# Patient Record
Sex: MALE | Race: WHITE
[De-identification: names, ages, dates, MRNs, and addresses within clinical notes are randomized per-mention and may not be internally consistent; named-entity substitution may affect disease eponyms.]

---

## 2019-01-14 ENCOUNTER — HOSPITAL ENCOUNTER (INPATIENT)
Dept: HOSPITAL 74 - JSAMEDAYSX | Age: 24
LOS: 4 days | Discharge: HOME | DRG: 304 | End: 2019-01-18
Attending: SPECIALIST | Admitting: ORTHOPAEDIC SURGERY
Payer: COMMERCIAL

## 2019-01-14 VITALS — BODY MASS INDEX: 19.8 KG/M2

## 2019-01-14 DIAGNOSIS — R00.0: ICD-10-CM

## 2019-01-14 DIAGNOSIS — M54.17: ICD-10-CM

## 2019-01-14 DIAGNOSIS — Z98.890: ICD-10-CM

## 2019-01-14 DIAGNOSIS — Z87.891: ICD-10-CM

## 2019-01-14 DIAGNOSIS — M48.07: ICD-10-CM

## 2019-01-14 DIAGNOSIS — R31.0: ICD-10-CM

## 2019-01-14 DIAGNOSIS — M48.062: Primary | ICD-10-CM

## 2019-01-14 DIAGNOSIS — M51.37: ICD-10-CM
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
## 2019-01-14 DIAGNOSIS — T83.83XA: ICD-10-CM

## 2019-01-14 DIAGNOSIS — M53.2X6: ICD-10-CM

## 2019-01-14 PROCEDURE — B01BZZZ FLUOROSCOPY OF SPINAL CORD: ICD-10-PCS | Performed by: ORTHOPAEDIC SURGERY

## 2019-01-14 PROCEDURE — 0ST40ZZ RESECTION OF LUMBOSACRAL DISC, OPEN APPROACH: ICD-10-PCS | Performed by: ORTHOPAEDIC SURGERY

## 2019-01-14 PROCEDURE — 07DR0ZZ EXTRACTION OF ILIAC BONE MARROW, OPEN APPROACH: ICD-10-PCS | Performed by: ORTHOPAEDIC SURGERY

## 2019-01-14 PROCEDURE — 4A1004G MONITORING OF CENTRAL NERVOUS ELECTRICAL ACTIVITY, INTRAOPERATIVE, OPEN APPROACH: ICD-10-PCS | Performed by: ORTHOPAEDIC SURGERY

## 2019-01-14 PROCEDURE — 0SG30AJ FUSION OF LUMBOSACRAL JOINT WITH INTERBODY FUSION DEVICE, POSTERIOR APPROACH, ANTERIOR COLUMN, OPEN APPROACH: ICD-10-PCS | Performed by: ORTHOPAEDIC SURGERY

## 2019-01-14 NOTE — OP
Operative Note





- Note:


Operative Date: 01/14/19


Pre-Operative Diagnosis: 1. L5-S1 intervertebral disc disorder with lower 

extremity radiculopathy.  2. L5-S1 spinal stenosis with neurogenic 

claudication.  3. L5-S1 segmental instability


Operation: 1. L5 laminectomy.  2. L3 & S1 laminotomies.  3. L5-S1 PLIF.  4. L5 

facetectomy/osteotomy.  5. L5-S1 posterior instrumentation.  6. L5-S1 

posterolateral arthrodesis.  7. Bone autograft.  8. Bone allograft.  9. Bone 

marrow aspiration.  10. Complex wound closure (10cm).  11. Biplanar 

fluoroscopy.  12. Intra-operative neural monitoring.  13. Bilateral L4, L5, S1 

dorsal ramus nerve blocks.


Post-Operative Diagnosis: Same as Pre-op


Surgeon: Carlitos Toribio


Assistant: Ismael Toribio


Anesthesiologist/CRNA: Radha Collins


Anesthesia: General, Local


Specimens Removed: L5-S1 disc


Estimated Blood Loss (mls): 600


Drains & Tubes with Location: 1 x deep HemoVac


Blood Volume Replaced (mls): 140 (Cell Saver)


Fluid Volume Replaced (mls): 1,400 (Crystalloid)


Operative Report Dictated: Yes

## 2019-01-14 NOTE — PN
Progress Note (short form)





- Note


Progress Note: 





23M s/p L5 laminectomy; L4 & S1 laminotomies; L5-S1 PLIF; L5-S1 posterior 

instrumentation; L5-S1 posterolateral arthrodesis; bone graft; bone marrow 

aspiration POD #0.





-Admit to ICU post-op.





-Pain control: per anaesthesia team; recommend PCA; no NSAID's.


-DVT PPx:


   - Mechanical only: SHERI's, SCD's.


-Incentive spirometry q15min.


-PT/OT/Rehab, OOB.


-WBAT B/L LE.


-q4h B/L LE NV checks.


-Post-op antibiotics x 2 doses.


-NPO until flatus.


-f/u AM labs.


-f/u drain output.


-d/c Cassidy catheter when patient ambulating comfortably.


-Care per ICU & medical hospitalist team.


-Discharge planning: f/u 1/25/2019 at [facility] [facility]; call for 

appointment; [phone].


-Will follow.





Carlitos Toribio MD (Orthopaedic Surgery).

## 2019-01-15 LAB
ANION GAP SERPL CALC-SCNC: 7 MMOL/L (ref 8–16)
BUN SERPL-MCNC: 14 MG/DL (ref 7–18)
CALCIUM SERPL-MCNC: 9 MG/DL (ref 8.5–10.1)
CHLORIDE SERPL-SCNC: 104 MMOL/L (ref 98–107)
CO2 SERPL-SCNC: 27 MMOL/L (ref 21–32)
CREAT SERPL-MCNC: 0.9 MG/DL (ref 0.55–1.3)
DEPRECATED RDW RBC AUTO: 13.3 % (ref 11.9–15.9)
GLUCOSE SERPL-MCNC: 113 MG/DL (ref 74–106)
HCT VFR BLD CALC: 38.2 % (ref 35.4–49)
HGB BLD-MCNC: 12.6 GM/DL (ref 11.7–16.9)
MCH RBC QN AUTO: 30.9 PG (ref 25.7–33.7)
MCHC RBC AUTO-ENTMCNC: 32.9 G/DL (ref 32–35.9)
MCV RBC: 93.9 FL (ref 80–96)
PLATELET # BLD AUTO: 185 K/MM3 (ref 134–434)
PMV BLD: 8.2 FL (ref 7.5–11.1)
POTASSIUM SERPLBLD-SCNC: 4.6 MMOL/L (ref 3.5–5.1)
RBC # BLD AUTO: 4.07 M/MM3 (ref 4–5.6)
SODIUM SERPL-SCNC: 137 MMOL/L (ref 136–145)
WBC # BLD AUTO: 7.4 K/MM3 (ref 4–10)

## 2019-01-15 RX ADMIN — ACETAMINOPHEN SCH MG: 10 INJECTION, SOLUTION INTRAVENOUS at 09:43

## 2019-01-15 RX ADMIN — DOCUSATE SODIUM,SENNOSIDES SCH TABLET: 50; 8.6 TABLET, FILM COATED ORAL at 22:38

## 2019-01-15 RX ADMIN — HYDROMORPHONE HYDROCHLORIDE SCH MG: 10 INJECTION, SOLUTION INTRAMUSCULAR; INTRAVENOUS; SUBCUTANEOUS at 00:20

## 2019-01-15 RX ADMIN — CEFAZOLIN SODIUM SCH MLS/HR: 1 INJECTION, SOLUTION INTRAVENOUS at 12:50

## 2019-01-15 RX ADMIN — ACETAMINOPHEN SCH MG: 10 INJECTION, SOLUTION INTRAVENOUS at 00:10

## 2019-01-15 RX ADMIN — SODIUM CHLORIDE, POTASSIUM CHLORIDE, SODIUM LACTATE AND CALCIUM CHLORIDE SCH: 600; 310; 30; 20 INJECTION, SOLUTION INTRAVENOUS at 03:03

## 2019-01-15 RX ADMIN — MORPHINE SULFATE PRN MG: 2 INJECTION, SOLUTION INTRAMUSCULAR; INTRAVENOUS at 20:06

## 2019-01-15 RX ADMIN — ACETAMINOPHEN SCH MG: 10 INJECTION, SOLUTION INTRAVENOUS at 15:55

## 2019-01-15 RX ADMIN — SODIUM CHLORIDE, POTASSIUM CHLORIDE, SODIUM LACTATE AND CALCIUM CHLORIDE SCH MLS/HR: 600; 310; 30; 20 INJECTION, SOLUTION INTRAVENOUS at 08:33

## 2019-01-15 RX ADMIN — Medication SCH APPLIC: at 09:56

## 2019-01-15 RX ADMIN — FAMOTIDINE SCH MLS/HR: 20 INJECTION, SOLUTION INTRAVENOUS at 10:01

## 2019-01-15 RX ADMIN — FAMOTIDINE SCH MLS/HR: 20 INJECTION, SOLUTION INTRAVENOUS at 22:38

## 2019-01-15 RX ADMIN — CEFAZOLIN SODIUM SCH MLS/HR: 1 INJECTION, SOLUTION INTRAVENOUS at 05:05

## 2019-01-15 RX ADMIN — KETOROLAC TROMETHAMINE SCH MG: 30 INJECTION INTRAMUSCULAR; INTRAVENOUS at 17:41

## 2019-01-15 RX ADMIN — KETOROLAC TROMETHAMINE SCH MG: 30 INJECTION INTRAMUSCULAR; INTRAVENOUS at 11:52

## 2019-01-15 RX ADMIN — HYDROMORPHONE HYDROCHLORIDE SCH: 10 INJECTION, SOLUTION INTRAMUSCULAR; INTRAVENOUS; SUBCUTANEOUS at 19:40

## 2019-01-15 RX ADMIN — MORPHINE SULFATE PRN MG: 2 INJECTION, SOLUTION INTRAMUSCULAR; INTRAVENOUS at 15:54

## 2019-01-15 NOTE — PN
Progress Note, Physician


History of Present Illness: 





24 HR EVENTS:





s/p L5, L3, S1 laminectomy, L5 facetectomy/osteotomy





pt pulled out hernandez in PACU, no urine output overnight. At the time of exam, pt 

with severe pelvic and lower back pain resulting in tachycardia. Urology called 

for hernandez placement. 


pt now tolerating orals





- Current Medication List


Current Medications: 


Active Medications





Famotidine/Sodium Chloride (Pepcid 20 Mg Premixed Ivpb -)  20 mg in 50 mls @ 

100 mls/hr IVPB BID [facility]


   Last Admin: 01/15/19 10:01 Dose:  100 mls/hr


Morphine Sulfate (Morphine Sulfate)  2 mg IVPUSH Q3H PRN


   PRN Reason: PAIN LEVEL 6-10


   Last Admin: 01/15/19 20:06 Dose:  2 mg


Ondansetron HCl (Zofran Injection)  4 mg IVPUSH Q6H PRN


   PRN Reason: NAUSEA AND/OR VOMITING


Ondansetron HCl (Zofran Injection)  4 mg IVPUSH Q6H PRN


   PRN Reason: NAUSEA AND/OR VOMITING


Oxycodone HCl (Roxicodone -)  10 mg PO Q6H PRN


   PRN Reason: PAIN LEVEL 1-5


Saliva Substitute (Mouthkote Solution -)  1 applic MM DAILY [facility]


   Last Admin: 01/15/19 09:56 Dose:  1 applic











- Objective


Vital Signs: 


 Vital Signs











Temperature  99.1 F   01/15/19 18:00


 


Pulse Rate  97 H  01/15/19 20:00


 


Respiratory Rate  15   01/15/19 20:00


 


Blood Pressure  132/74   01/15/19 20:00


 


O2 Sat by Pulse Oximetry (%)  100   01/15/19 00:45











Labs: 


 CBC, BMP





 01/15/19 05:30 





 01/15/19 05:30 











Problem List





- Problems


(1) S/P laminectomy


Assessment/Plan: 


progress diet as tolerated


IC


ambulate with PT


pain control with oxycodone and morphine








Code(s): Z98.890 - OTHER SPECIFIED POSTPROCEDURAL STATES   





Impression/Plan


Impression/Plan: 





DISPO: transfer to step floor





DVT PPX: venodynes





GI: pepcid, Zofran PRN nausea





d/c hernandez in the morning. 








Visit type





- Emergency Visit


Emergency Visit: No





- New Patient


This patient is new to me today: Yes


Date on this admission: 01/14/19





- Critical Care


Critical Care patient: Yes


Total Critical Care Time (in minutes): 30


Critical Care Statement: The care of this patient involved high complexity 

decision making to prevent further life threatening deterioration of the patient

's condition and/or to evaluate & treat vital organ system(s) failure or risk 

of failure.





- Discharge Referral


Referred to [facility] No
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
## 2019-01-15 NOTE — CONSULT
Consult - text type





- Consultation


Consultation Note: 





called to see 22 yo pt s/p laminectomy L5-S1 who self d/c ed hernandez w resulting 

gross hematuria


staff unable to pass catheter


Pt now c/o distention and pain unable to void





18 fr hernandez placed to SD approx 1200 cc drained without difficulty


urine clear 


Consider voiding trial in am or when more mobile

## 2019-01-15 NOTE — PN
Teaching Attending Note


Name of Resident: Viktoria Singletary





ATTENDING PHYSICIAN STATEMENT





I saw and evaluated the patient.


I reviewed the resident's note and discussed the case with the resident.


I agree with the resident's findings and plan as documented.








SUBJECTIVE:


Patient seen and examined in the ICU.  Awake and alert.  Unable to pass urine 

has he traumatically pulled his hernandez out in the PACU.  Urology has been 

called. 


No CP or SOB.


Post op pain is 8/10.  Noted PCA was discontinued by anesthesia. 





 Intake & Output











 01/12/19 01/13/19 01/14/19 01/15/19





 23:59 23:59 23:59 23:59


 


Intake Total   1840 50


 


Output Total   1030 50


 


Balance   810 0








 Last Vital Signs











Temp Pulse Resp BP Pulse Ox


 


 98.7 F   97 H  8 L  131/83   100 


 


 01/15/19 10:00  01/15/19 12:00  01/15/19 12:00  01/15/19 12:00  01/15/19 00:45








Active Medications





Acetaminophen (Ofirmev Injection -)  1,000 mg IVPB Q8H [facility]


   Stop: 01/15/19 15:46


   Last Admin: 01/15/19 09:43 Dose:  1,000 mg


Cefazolin Sodium (Ancef 1 Gm Premixed Ivpb -)  1 gm in 50 mls @ 100 mls/hr IVPB 

Q8H [facility]


   Stop: 01/15/19 13:29


   Last Admin: 01/15/19 05:05 Dose:  100 mls/hr


Lactated Ringer's (Lactated Ringers Solution)  1,000 mls @ 125 mls/hr IV ASDIR 

[facility]


   Last Admin: 01/15/19 00:20 Dose:  50 mls


Lactated Ringer's (Lactated Ringers Solution)  1,000 mls @ 125 mls/hr IV ASDIR 

[facility]


   Last Admin: 01/15/19 08:33 Dose:  125 mls/hr


Famotidine/Sodium Chloride (Pepcid 20 Mg Premixed Ivpb -)  20 mg in 50 mls @ 

100 mls/hr IVPB BID [facility]


   Last Admin: 01/15/19 10:01 Dose:  100 mls/hr


Ketorolac Tromethamine (Toradol Injection -)  30 mg IM Q8H-IV [facility]


   Stop: 01/15/19 18:01


   Last Admin: 01/15/19 11:52 Dose:  30 mg


Ondansetron HCl (Zofran Injection)  4 mg IVPUSH Q6H PRN


   PRN Reason: NAUSEA AND/OR VOMITING


Ondansetron HCl (Zofran Injection)  4 mg IVPUSH Q6H PRN


   PRN Reason: NAUSEA AND/OR VOMITING


Oxycodone HCl (Roxicodone -)  10 mg PO Q6H PRN


   PRN Reason: PAIN LEVEL 6-10


   Last Admin: 01/15/19 09:03 Dose:  10 mg


Oxycodone HCl (Roxicodone -)  5 mg PO Q4H PRN


   PRN Reason: PAIN LEVEL 1-5


   Last Admin: 01/15/19 10:08 Dose:  5 mg


Saliva Substitute (Mouthkote Solution -)  1 applic MM DAILY [facility]


   Last Admin: 01/15/19 09:56 Dose:  1 applic








Constitutional: Yes: Agitated, NAD 


Eyes: Yes: Conjunctiva Clear, EOM Intact, PERRL


HENT: Yes: Atraumatic, Normocephalic, Other (very dry mucous membranes).  No: 

Thrush


Neck: Yes: Supple, Trachea Midline.  No: Lymphadenopathy, Thyromegaly


Cardiovascular: Yes: Regular Rate and Rhythm.  No: Murmur


Respiratory: Yes: Regular, CTA Bilaterally


...Inspection: Yes: Trachea Midline


Gastrointestinal: Yes: (+) supra-pubic distention 


Musculoskeletal: Yes: Back Pain 


Extremities: Yes: WNL


Edema: No


Peripheral Pulses WNL: Yes


Neurological: Yes: Alert, Oriented


 Laboratory Results - last 24 hr











  01/14/19 01/14/19 01/15/19





  12:34 13:40 05:30


 


WBC    7.4


 


RBC    4.07


 


Hgb    12.6


 


Hct    38.2


 


MCV    93.9


 


MCH    30.9


 


MCHC    32.9


 


RDW    13.3


 


Plt Count    185


 


MPV    8.2


 


Sodium   


 


Potassium   


 


Chloride   


 


Carbon Dioxide   


 


Anion Gap   


 


BUN   


 


Creatinine   


 


Creat Clearance w eGFR   


 


Random Glucose   


 


Calcium   


 


Blood Type  A POSITIVE  A POSITIVE 


 


Antibody Screen  Negative  














  01/15/19





  05:30


 


WBC 


 


RBC 


 


Hgb 


 


Hct 


 


MCV 


 


MCH 


 


MCHC 


 


RDW 


 


Plt Count 


 


MPV 


 


Sodium  137


 


Potassium  4.6


 


Chloride  104


 


Carbon Dioxide  27


 


Anion Gap  7 L


 


BUN  14


 


Creatinine  0.9


 


Creat Clearance w eGFR  > 60


 


Random Glucose  113 H


 


Calcium  9.0


 


Blood Type 


 


Antibody Screen 











Assessment/Plan


POD #1: 


1. L5 laminectomy


2. L3 & S1 laminotomies


3. L5-S1 PLIF


4. L5 facetectomy/osteotomy


5. L5-S1 posterior instrumentation


6. L5-S1 posterolateral arthrodesis


7. Bone autograft


8. Bone allograft


9. Bone marrow aspiration


10. Complex wound closure (10cm)


11. Biplanar fluoroscopy


12. Intra-operative neural monitoring


13. Bilateral L4, L5, S1 dorsal ramus nerve blocks





Urinary retention due to traumatic hernandez removal by patient 





Urology evaluation 


Pain control 


Incentive Spirometry 


VTE prophylaxis 


PO as tolerated 


PT 


Neuro checks 


Floor when OK with surgical attending 





Dr Lyle

## 2019-01-15 NOTE — OP
DATE OF OPERATION:  

 

DATE OF DICTATION:  01/14/2019

 

SURGEON:  Carlitos Toribio MD

 

ASSISTANT:  Ismael Toribio MD



PRE-OP DIAGNOSIS:

   1. L5-S1 intervertebral disc disorder with lower extremity radiculopathy.  

   2. L5-S1 spinal stenosis with neurogenic claudication.  

   3. L5-S1 segmental instability



POST-OP DIAGNOSIS: 

   1. L5-S1 intervertebral disc disorder with lower extremity radiculopathy.  

   2. L5-S1 spinal stenosis with neurogenic claudication.  

   3. L5-S1 segmental instability



OPERATION: 

   1. L5 laminectomy.  

   2. L3 & S1 laminotomies.  

   3. L5-S1 PLIF.  

   4. L5 facetectomy/osteotomy.  

   5. L5-S1 posterior instrumentation.  

   6. L5-S1 posterolateral arthrodesis.  

   7. Bone autograft.  

   8. Bone allograft.  

   9. Bone marrow aspiration.  

   10. Complex wound closure (15cm).  

   11. Biplanar fluoroscopy.  

   12. Intra-operative neural monitoring.  

   13. Bilateral L4, L5, S1 dorsal ramus nerve blocks (Marcaine & Exparel).

 

ANESTHESIA:  General.

 

ANTIBIOTICS GIVEN:  Kefzol 2 g, 1 g of vancomycin preoperatively.

 

OPERATION IN DETAIL:  The patient correctly identified, brought to the operating

room.  Imaging was available for intraoperative evaluation.   Timeout was 
called. 

The patient placed prone on a Tigre table.  All bony prominences padded

appropriately.  Eyes were padded for pressure and the table was set at 10 
degrees in

deep Trendelenburg for optic vein venous flow.

 

A midline incision was utilized from the tip of the spinous process of L4 to 
the tip

of the spinous process of S1.  Subperiosteal dissection was performed.  
Anatomical

guidelines revealed the correct levels of the last mobile segment of the spine.
  A

lateral fluoroscopic x-ray confirmed the correct disk for resection which was 
the

L5-S1 disk depending on the nomenclature of the spine.  Using Leksell rongeurs 
and

Mercedes bone cutters the lamina of L5 was resected in order to resect 
laterally.  The

pars interarticularis as well as the inferior facet was osteotomized imploding 
the

bone inwards.  The disk from the right-hand side was approached with a nerve 
root

retractor being placed under the theca distracting it from right to left.  The 
large

epidural veins were dealt with with bipolar Bovie.  Large bulging disk was

encountered, but most applicable to this case was the malalignment of facet 
joints

readily seen that were subluxed completely on each other  at L5-S1.

 

The disk of L5-S1 was dealt with as follows:  A transverse annulotomy was 
performed. 

Kat right up to size 12 were inserted.  All disk material was removed using

pituitary rongeurs.  Serrated curets helped ensure that the endplates were free 
of

any soft tissue.  Once this had been performed the interbody space was packed 
with

the bone that was harvested previously and packed into the interspace at

L5-S1.  A size 13, 22 x 13-mm cage inserted.  The cage itself was packed with 
bone

graft.  Solid fixation, excellent position seen on lateral fluoroscopic x-ray 
once

inserted.  The pedicles of L5-S1 were identified using anatomical guidelines. 

Lateral fluoroscopic x-ray enabled the 4.5 drill was used to drill through the

pedicle into the vertebral body.  The 45 x 6.5 screws at L5 and 6.5 x 40-mm 
screws at

L4 inserted.  Screws were tested with neural monitoring and found to be 
completely

safe in the appropriate parameters of safety, that is well above 20 mA for each 
screw

except in the right S1 screw which measured 12 mA.  The rods were measured.  
These

were 40-mm rods fixed to the capture and the caps tightened appropriately with 
the

appropriate torque device from the precision instrumentation system.

 

The wounds were thoroughly lavaged.  Bone grafting which was a combination of

autologous bone, allograft bone and this mixed with stem cells harvested from 
the

left posterior ilium.  A Jamshidi needle was placed into the posterior ilium

aspirating 60 mL of marrow.  This enabled a rich biological bone grafting to the

transverse process/alar interface appropriately.  Wounds were thoroughly 
lavaged. 

Closure:  Muscle 1 Vicryl, fascia 1 Vicryl, subcu 1 and 2-0 Vicryl, skin 3-0 
Monocryl

with Steri-Strips.  Drainage:  A 1/8-inch Hemovac x1 deep.  No complications. 

Operation went extremely well.  It was reported as the intraoperative neural

monitoring revealed once the decompression and diskectomy were performed that 
the

actual neural monitoring of the quadriceps as well as sciatic nerves improved

dramatically.  Patient was transferred out of the operating room and will be 
nursed

in the ICU postoperatively.

 

 

MD KATY Tolliver/[id number]

DD: 01/14/2019 23:23

DT: 01/15/2019 09:50

Job #:  [id number]

[email or web address]

## 2019-01-15 NOTE — PN
Progress Note (short form)





- Note


Progress Note: 





Anesthesiology Post-op/Pain Service


23 y.o. man POD#1 s/p lumbar PLIF under GA with post-op PCA.


Pt. is sitting -up, taking PO without difficulty in NAD. He does c/o pain and 

had been using PCA. VSS. No other complaints.


23 y.o. man with stable post-operative course.


Will d/c PCA and start PO pain meds. d/w RN and pt.

## 2019-01-16 LAB
ALBUMIN SERPL-MCNC: 3.2 G/DL (ref 3.4–5)
ALP SERPL-CCNC: 40 U/L (ref 45–117)
ALT SERPL-CCNC: 23 U/L (ref 13–61)
ANION GAP SERPL CALC-SCNC: 5 MMOL/L (ref 8–16)
AST SERPL-CCNC: 21 U/L (ref 15–37)
BASOPHILS # BLD: 0.3 % (ref 0–2)
BILIRUB SERPL-MCNC: 0.3 MG/DL (ref 0.2–1)
BUN SERPL-MCNC: 13 MG/DL (ref 7–18)
CALCIUM SERPL-MCNC: 8.1 MG/DL (ref 8.5–10.1)
CHLORIDE SERPL-SCNC: 107 MMOL/L (ref 98–107)
CO2 SERPL-SCNC: 30 MMOL/L (ref 21–32)
CREAT SERPL-MCNC: 0.9 MG/DL (ref 0.55–1.3)
DEPRECATED RDW RBC AUTO: 13.6 % (ref 11.9–15.9)
EOSINOPHIL # BLD: 1.5 % (ref 0–4.5)
GLUCOSE SERPL-MCNC: 119 MG/DL (ref 74–106)
HCT VFR BLD CALC: 31.7 % (ref 35.4–49)
HGB BLD-MCNC: 11.1 GM/DL (ref 11.7–16.9)
LYMPHOCYTES # BLD: 26.2 % (ref 8–40)
MAGNESIUM SERPL-MCNC: 2.5 MG/DL (ref 1.8–2.4)
MCH RBC QN AUTO: 32.9 PG (ref 25.7–33.7)
MCHC RBC AUTO-ENTMCNC: 35.1 G/DL (ref 32–35.9)
MCV RBC: 93.6 FL (ref 80–96)
MONOCYTES # BLD AUTO: 9.2 % (ref 3.8–10.2)
NEUTROPHILS # BLD: 62.8 % (ref 42.8–82.8)
PHOSPHATE SERPL-MCNC: 5.1 MG/DL (ref 2.5–4.9)
PLATELET # BLD AUTO: 164 K/MM3 (ref 134–434)
PMV BLD: 8.3 FL (ref 7.5–11.1)
POTASSIUM SERPLBLD-SCNC: 3.5 MMOL/L (ref 3.5–5.1)
PROT SERPL-MCNC: 5.6 G/DL (ref 6.4–8.2)
RBC # BLD AUTO: 3.39 M/MM3 (ref 4–5.6)
SODIUM SERPL-SCNC: 142 MMOL/L (ref 136–145)
WBC # BLD AUTO: 7.4 K/MM3 (ref 4–10)

## 2019-01-16 RX ADMIN — POLYETHYLENE GLYCOL 3350 SCH GM: 17 POWDER, FOR SOLUTION ORAL at 13:24

## 2019-01-16 RX ADMIN — MORPHINE SULFATE PRN MG: 2 INJECTION, SOLUTION INTRAMUSCULAR; INTRAVENOUS at 07:17

## 2019-01-16 RX ADMIN — Medication SCH: at 09:39

## 2019-01-16 RX ADMIN — FAMOTIDINE SCH MLS/HR: 20 INJECTION, SOLUTION INTRAVENOUS at 10:02

## 2019-01-16 RX ADMIN — DOCUSATE SODIUM,SENNOSIDES SCH TABLET: 50; 8.6 TABLET, FILM COATED ORAL at 21:22

## 2019-01-16 RX ADMIN — DOCUSATE SODIUM SCH MG: 100 CAPSULE, LIQUID FILLED ORAL at 11:39

## 2019-01-16 RX ADMIN — MORPHINE SULFATE PRN MG: 2 INJECTION, SOLUTION INTRAMUSCULAR; INTRAVENOUS at 18:15

## 2019-01-16 RX ADMIN — MORPHINE SULFATE PRN MG: 2 INJECTION, SOLUTION INTRAMUSCULAR; INTRAVENOUS at 10:17

## 2019-01-16 NOTE — PATH
Surgical Pathology Report



Patient Name:  KIRSTEN BRUNO

Accession #:  

Med. Rec. #:  [id number]                                                        

   /Age/Gender:  1995 (Age: 23) / M

Account:  [id number]                                                          

             Location: [facility] 

Taken:  2019

Received:  1/15/2019

Reported:  2019

Physicians:  Carlitos Toribio M.D.

  



Specimen(s) Received

 L5-S1 DISC 





Clinical History

Spinal stenosis lumbar region







Final Diagnosis

DISC, L5-S1, POSTERIOR LUMBAR INTERBODY FUSION:

BENIGN INTERVERTEBRAL DISC TISSUE, BONE, AND BONE MARROW WITH TRILINEAGE

HEMATOPOIESIS.





***Electronically Signed***

Mary Lozano M.D.





Gross Description

Received in formalin labeled "disc L5-S1," is a 4.5 x 3.8 x 0.4 cm aggregate of

tan fragments of fibrocartilaginous tissue. A representative portion is

submitted in one cassette.

/1/15/2019



saudi/1/15/2019
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
## 2019-01-16 NOTE — PN
Teaching Attending Note


Name of Resident: Kenneth Rodriguez





ATTENDING PHYSICIAN STATEMENT





I saw and evaluated the patient.


I reviewed the resident's note and discussed the case with the resident.


I agree with the resident's findings and plan as documented.








SUBJECTIVE:





Pt seen and examined in the ICU. Still with pain. No nausea or vomiting. 

Tolerating diet. No fevers or chills. No shortness of breath or chest pain.





OBJECTIVE:





 Vital Signs











 Period  Temp  Pulse  Resp  BP Sys/Vogt  Pulse Ox


 


 Last 24 Hr  98.3 F-99.1 F    12-17  /49-78  97-97








 Intake & Output











 01/13/19 01/14/19 01/15/19 01/16/19





 23:59 23:59 23:59 23:59


 


Intake Total  1840 1610 


 


Output Total  1030 2850 


 


Balance  810 -1240 








Gen:  NAD at rest


Heart: RRR


Lung: decreased breath sounds at the bases


Abd: soft, nontender


Ext: no edema


Drain with serosanguinous fluid





 CBC, BMP





 01/16/19 05:30 





 01/16/19 05:30 





Active Medications





Docusate Sodium (Colace -)  100 mg PO DAILY [facility]


   Last Admin: 01/16/19 11:39 Dose:  100 mg


Famotidine/Sodium Chloride (Pepcid 20 Mg Premixed Ivpb -)  20 mg in 50 mls @ 

100 mls/hr IVPB BID RILEY


   Last Admin: 01/16/19 10:02 Dose:  100 mls/hr


Morphine Sulfate (Morphine Sulfate)  2 mg IVPUSH Q3H PRN


   PRN Reason: PAIN LEVEL 6-10


   Last Admin: 01/16/19 10:17 Dose:  2 mg


Ondansetron HCl (Zofran Injection)  4 mg IVPUSH Q6H PRN


   PRN Reason: NAUSEA AND/OR VOMITING


Ondansetron HCl (Zofran Injection)  4 mg IVPUSH Q6H PRN


   PRN Reason: NAUSEA AND/OR VOMITING


Oxycodone HCl (Roxicodone -)  10 mg PO Q6H PRN


   PRN Reason: PAIN LEVEL 1-5


   Last Admin: 01/16/19 06:17 Dose:  10 mg


Polyethylene Glycol (Miralax (For Daily Use) -)  17 gm PO DAILY [facility]


Saliva Substitute (Mouthkote Solution -)  1 applic MM DAILY RILEY


   Last Admin: 01/16/19 09:39 Dose:  Not Given


Senna/Docusate Sodium (Pericolace -)  1 tablet PO HS RILEY


   Last Admin: 01/15/19 22:38 Dose:  1 tablet








ASSESSMENT AND PLAN:


L5-S1 Spinal Stenosis with Neurogenic Claudication/Radiculopathy


s/p L5, L3, S1 Laminectomies/L5-S1 PLIF/instrumentation





-  pain control


-  incentive spirometry


-  monitor drain output


-  bowel regimen


-  d/c hernandez


-  OOB 


-  rehab/PT


-  DVT prophylaxis


-  can monitor on floor

## 2019-01-17 LAB
ALBUMIN SERPL-MCNC: 3.4 G/DL (ref 3.4–5)
ALP SERPL-CCNC: 48 U/L (ref 45–117)
ALT SERPL-CCNC: 79 U/L (ref 13–61)
ANION GAP SERPL CALC-SCNC: 8 MMOL/L (ref 8–16)
AST SERPL-CCNC: 77 U/L (ref 15–37)
BASOPHILS # BLD: 0.3 % (ref 0–2)
BILIRUB SERPL-MCNC: 0.5 MG/DL (ref 0.2–1)
BUN SERPL-MCNC: 7 MG/DL (ref 7–18)
CALCIUM SERPL-MCNC: 8.2 MG/DL (ref 8.5–10.1)
CHLORIDE SERPL-SCNC: 105 MMOL/L (ref 98–107)
CO2 SERPL-SCNC: 28 MMOL/L (ref 21–32)
CREAT SERPL-MCNC: 0.9 MG/DL (ref 0.55–1.3)
DEPRECATED RDW RBC AUTO: 13.5 % (ref 11.9–15.9)
EOSINOPHIL # BLD: 2.5 % (ref 0–4.5)
GLUCOSE SERPL-MCNC: 97 MG/DL (ref 74–106)
HCT VFR BLD CALC: 32.5 % (ref 35.4–49)
HGB BLD-MCNC: 11.4 GM/DL (ref 11.7–16.9)
LYMPHOCYTES # BLD: 28.5 % (ref 8–40)
MAGNESIUM SERPL-MCNC: 1.9 MG/DL (ref 1.8–2.4)
MCH RBC QN AUTO: 33 PG (ref 25.7–33.7)
MCHC RBC AUTO-ENTMCNC: 35 G/DL (ref 32–35.9)
MCV RBC: 94.2 FL (ref 80–96)
MONOCYTES # BLD AUTO: 12.6 % (ref 3.8–10.2)
NEUTROPHILS # BLD: 56.1 % (ref 42.8–82.8)
PHOSPHATE SERPL-MCNC: 3.6 MG/DL (ref 2.5–4.9)
PLATELET # BLD AUTO: 146 K/MM3 (ref 134–434)
PMV BLD: 8.2 FL (ref 7.5–11.1)
POTASSIUM SERPLBLD-SCNC: 3.6 MMOL/L (ref 3.5–5.1)
PROT SERPL-MCNC: 5.9 G/DL (ref 6.4–8.2)
RBC # BLD AUTO: 3.45 M/MM3 (ref 4–5.6)
SODIUM SERPL-SCNC: 141 MMOL/L (ref 136–145)
WBC # BLD AUTO: 6.1 K/MM3 (ref 4–10)

## 2019-01-17 RX ADMIN — MORPHINE SULFATE PRN MG: 2 INJECTION, SOLUTION INTRAMUSCULAR; INTRAVENOUS at 09:31

## 2019-01-17 RX ADMIN — POLYETHYLENE GLYCOL 3350 SCH GM: 17 POWDER, FOR SOLUTION ORAL at 10:06

## 2019-01-17 RX ADMIN — MORPHINE SULFATE PRN MG: 2 INJECTION, SOLUTION INTRAMUSCULAR; INTRAVENOUS at 12:41

## 2019-01-17 RX ADMIN — DOCUSATE SODIUM,SENNOSIDES SCH TABLET: 50; 8.6 TABLET, FILM COATED ORAL at 21:20

## 2019-01-17 RX ADMIN — DOCUSATE SODIUM SCH MG: 100 CAPSULE, LIQUID FILLED ORAL at 09:36

## 2019-01-17 RX ADMIN — MORPHINE SULFATE PRN MG: 2 INJECTION, SOLUTION INTRAMUSCULAR; INTRAVENOUS at 05:50

## 2019-01-17 RX ADMIN — MORPHINE SULFATE PRN MG: 2 INJECTION, SOLUTION INTRAMUSCULAR; INTRAVENOUS at 02:02

## 2019-01-17 RX ADMIN — Medication SCH: at 10:06
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
## 2019-01-17 NOTE — PN
Teaching Attending Note


Name of Resident: Viktoria Singletary





ATTENDING PHYSICIAN STATEMENT





I saw and evaluated the patient.


I reviewed the resident's note and discussed the case with the resident.


I agree with the resident's findings and plan as documented.








SUBJECTIVE:


Patient seen and examined in the ICU. 


Still with pain, 8/10. 


No nausea or vomiting. Tolerating diet. 


No fevers or chills. 


No shortness of breath or chest pain.





OBJECTIVE:





  


 Intake & Output











 01/14/19 01/15/19 01/16/19 01/17/19





 23:59 23:59 23:59 23:59


 


Intake Total 1840 1610  580


 


Output Total 1030 2850  900


 


Balance 810 -1240  -320


 


Weight   150 lb 








 Last Vital Signs











Temp Pulse Resp BP Pulse Ox


 


 99.4 F   91 H  16   119/77   97 


 


 01/17/19 10:00  01/17/19 10:00  01/17/19 10:00  01/17/19 10:00  01/17/19 09:00








Active Medications





Docusate Sodium (Colace -)  100 mg PO DAILY [facility]


   Last Admin: 01/17/19 09:36 Dose:  100 mg


Morphine Sulfate (Morphine Sulfate)  2 mg IVPUSH Q3H PRN


   PRN Reason: PAIN LEVEL 6-10


   Last Admin: 01/17/19 09:31 Dose:  2 mg


Ondansetron HCl (Zofran Injection)  4 mg IVPUSH Q6H PRN


   PRN Reason: NAUSEA AND/OR VOMITING


Ondansetron HCl (Zofran Injection)  4 mg IVPUSH Q6H PRN


   PRN Reason: NAUSEA AND/OR VOMITING


Oxycodone HCl (Roxicodone -)  10 mg PO Q6H PRN


   PRN Reason: PAIN LEVEL 1-5


   Last Admin: 01/17/19 09:57 Dose:  10 mg


Polyethylene Glycol (Miralax (For Daily Use) -)  17 gm PO DAILY [facility]


   Last Admin: 01/17/19 10:06 Dose:  17 gm


Saliva Substitute (Mouthkote Solution -)  1 applic MM DAILY [facility]


   Last Admin: 01/17/19 10:06 Dose:  Not Given


Senna/Docusate Sodium (Pericolace -)  1 tablet PO HS [facility]


   Last Admin: 01/16/19 21:22 Dose:  1 tablet








Gen:  NAD at rest


Heart: RRR


Lung: decreased breath sounds at the bases


Abd: soft, nontender


Ext: no edema


 Laboratory Results - last 24 hr











  01/17/19 01/17/19





  05:30 05:30


 


WBC  6.1 


 


RBC  3.45 L 


 


Hgb  11.4 L 


 


Hct  32.5 L 


 


MCV  94.2 


 


MCH  33.0 


 


MCHC  35.0 


 


RDW  13.5 


 


Plt Count  146 


 


MPV  8.2 


 


Absolute Neuts (auto)  3.4 


 


Neutrophils %  56.1 


 


Lymphocytes %  28.5 


 


Monocytes %  12.6 H 


 


Eosinophils %  2.5 


 


Basophils %  0.3 


 


Nucleated RBC %  0 


 


Sodium   141


 


Potassium   3.6


 


Chloride   105


 


Carbon Dioxide   28


 


Anion Gap   8


 


BUN   7


 


Creatinine   0.9


 


Creat Clearance w eGFR   > 60


 


Random Glucose   97


 


Calcium   8.2 L


 


Phosphorus   3.6


 


Magnesium   1.9


 


Total Bilirubin   0.5


 


AST   77 H


 


ALT   79 H


 


Alkaline Phosphatase   48


 


Total Protein   5.9 L


 


Albumin   3.4














ASSESSMENT AND PLAN:


L5-S1 Spinal Stenosis with Neurogenic Claudication/Radiculopathy


s/p L5, L3, S1 Laminectomies/L5-S1 PLIF/instrumentation





-  pain control


-  incentive spirometry


-  monitor drain output


-  bowel regimen


-  OOB 


-  rehab/PT


-  DVT prophylaxis


-  Floor DC planning 





Dr Lyle

## 2019-01-17 NOTE — PN
Progress Note (short form)





- Note


Progress Note: 





POD#3


In ICU





C/O incisional pain  original pain gone  no leg pain





Vitals as per chart


Passed flatus Abd soft


Wound   Bandage dry  Drain pulled


Neuro   At base line





ASSESS   Doing well





PLAN   D/C home when stabilized


      PAIN mx


      See in office 1 week

## 2019-01-18 VITALS — TEMPERATURE: 98 F | SYSTOLIC BLOOD PRESSURE: 119 MMHG | HEART RATE: 93 BPM | DIASTOLIC BLOOD PRESSURE: 90 MMHG

## 2019-01-18 LAB
ALBUMIN SERPL-MCNC: 3.5 G/DL (ref 3.4–5)
ALP SERPL-CCNC: 118 U/L (ref 45–117)
ALT SERPL-CCNC: 271 U/L (ref 13–61)
ANION GAP SERPL CALC-SCNC: 8 MMOL/L (ref 8–16)
AST SERPL-CCNC: 222 U/L (ref 15–37)
BASOPHILS # BLD: 0.3 % (ref 0–2)
BILIRUB SERPL-MCNC: 1.5 MG/DL (ref 0.2–1)
BUN SERPL-MCNC: 9 MG/DL (ref 7–18)
CALCIUM SERPL-MCNC: 8.8 MG/DL (ref 8.5–10.1)
CHLORIDE SERPL-SCNC: 102 MMOL/L (ref 98–107)
CO2 SERPL-SCNC: 29 MMOL/L (ref 21–32)
CREAT SERPL-MCNC: 0.9 MG/DL (ref 0.55–1.3)
DEPRECATED RDW RBC AUTO: 13.2 % (ref 11.9–15.9)
EOSINOPHIL # BLD: 3.6 % (ref 0–4.5)
GLUCOSE SERPL-MCNC: 90 MG/DL (ref 74–106)
HCT VFR BLD CALC: 34.3 % (ref 35.4–49)
HGB BLD-MCNC: 12 GM/DL (ref 11.7–16.9)
LYMPHOCYTES # BLD: 23.6 % (ref 8–40)
MAGNESIUM SERPL-MCNC: 2.2 MG/DL (ref 1.8–2.4)
MCH RBC QN AUTO: 32.8 PG (ref 25.7–33.7)
MCHC RBC AUTO-ENTMCNC: 35.1 G/DL (ref 32–35.9)
MCV RBC: 93.6 FL (ref 80–96)
MONOCYTES # BLD AUTO: 8.8 % (ref 3.8–10.2)
NEUTROPHILS # BLD: 63.7 % (ref 42.8–82.8)
PHOSPHATE SERPL-MCNC: 4.4 MG/DL (ref 2.5–4.9)
PLATELET # BLD AUTO: 160 K/MM3 (ref 134–434)
PMV BLD: 8 FL (ref 7.5–11.1)
POTASSIUM SERPLBLD-SCNC: 4 MMOL/L (ref 3.5–5.1)
PROT SERPL-MCNC: 6.3 G/DL (ref 6.4–8.2)
RBC # BLD AUTO: 3.66 M/MM3 (ref 4–5.6)
SODIUM SERPL-SCNC: 139 MMOL/L (ref 136–145)
WBC # BLD AUTO: 5.6 K/MM3 (ref 4–10)

## 2019-01-18 RX ADMIN — DOCUSATE SODIUM SCH MG: 100 CAPSULE, LIQUID FILLED ORAL at 09:42

## 2019-01-18 RX ADMIN — POLYETHYLENE GLYCOL 3350 SCH GM: 17 POWDER, FOR SOLUTION ORAL at 09:43
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
## 2019-01-18 NOTE — PN
Progress Note (short form)





- Note


Progress Note: 





Marked improvement


C/O incisional pain


Neuro at baseline





General medical parameters all WNL





PLAN   D/C home


      Pain mx 


      Mobilize at home 


      See in office 1 week

## 2019-01-18 NOTE — PN
Physical Exam: 


SUBJECTIVE: Patient seen and examined, he has severe pain in the back and the 

complaints is that the medication effect is not long enough. has BM and has no 

other complaints at this time








OBJECTIVE:





 Vital Signs











 Period  Temp  Pulse  Resp  BP Sys/Vogt  Pulse Ox


 


 Last 24 Hr  98.0 F-99.8 F  82-98  11-20  102-123/67-87  97-97











GENERAL: The patient is awake, alert, and fully oriented, in no acute distress.


HEAD: Normal with no signs of trauma.


EYES: PERRL, extraocular movements intact, sclera anicteric, conjunctiva clear. 

No ptosis. 


ENT: Ears normal, nares patent, oropharynx clear without exudates, moist mucous 


membranes.


NECK: Trachea midline, full range of motion, supple. 


LUNGS: Breath sounds equal, clear to auscultation bilaterally, no wheezes, no 

crackles, no 


accessory muscle use. 


HEART: Regular rate and rhythm, S1, S2 without murmur, rub or gallop.


ABDOMEN: Soft, nontender, nondistended, normoactive bowel sounds, no guarding,


EXTREMITIES: 2+ pulses, warm, well-perfused, no edema. 


NEUROLOGICAL: Cranial nerves II through XII grossly intact. 


PSYCH: Normal mood, normal affect.


SKIN: Warm, dry, normal turgor, no rashes or lesions noted














 Laboratory Results - last 24 hr











  01/17/19 01/18/19





  05:30 06:30


 


WBC   5.6


 


RBC   3.66 L


 


Hgb   12.0


 


Hct   34.3 L


 


MCV   93.6


 


MCH   32.8


 


MCHC   35.1


 


RDW   13.2


 


Plt Count   160


 


MPV   8.0


 


Absolute Neuts (auto)   3.6


 


Neutrophils %   63.7


 


Lymphocytes %   23.6


 


Monocytes %   8.8


 


Eosinophils %   3.6


 


Basophils %   0.3


 


Nucleated RBC %   0


 


Sodium  141 


 


Potassium  3.6 


 


Chloride  105 


 


Carbon Dioxide  28 


 


Anion Gap  8 


 


BUN  7 


 


Creatinine  0.9 


 


Creat Clearance w eGFR  > 60 


 


Random Glucose  97 


 


Calcium  8.2 L 


 


Phosphorus  3.6 


 


Magnesium  1.9 


 


Total Bilirubin  0.5 


 


AST  77 H 


 


ALT  79 H 


 


Alkaline Phosphatase  48 


 


Total Protein  5.9 L 


 


Albumin  3.4 








Active Medications











Generic Name Dose Route Start Last Admin





  Trade Name Freq  PRN Reason Stop Dose Admin


 


Docusate Sodium  100 mg  01/16/19 10:45  01/17/19 09:36





  Colace -  PO   100 mg





  DAILY RILEY   Administration





     





     





     





     


 


Morphine Sulfate  4 mg  01/17/19 15:25  01/18/19 04:18





  Morphine Sulfate  IVPUSH   4 mg





  Q4H PRN   Administration





  PAIN LEVEL 4 - 6   





     





     





     


 


Ondansetron HCl  4 mg  01/17/19 18:00  





  Zofran Injection  IVPUSH   





  Q6H PRN   





  NAUSEA AND/OR VOMITING   





     





     





     


 


Oxycodone HCl  10 mg  01/15/19 16:23  01/17/19 23:01





  Roxicodone -  PO   10 mg





  Q6H PRN   Administration





  PAIN LEVEL 1-5   





     





     





     


 


Polyethylene Glycol  17 gm  01/16/19 10:45  01/17/19 10:06





  Miralax (For Daily Use) -  PO   17 gm





  DAILY RILEY   Administration





     





     





     





     


 


Saliva Substitute  1 applic  01/18/19 10:00  





  Mouthkote Solution -  MM   





  DAILY RILEY   





     





     





     





     


 


Senna/Docusate Sodium  1 tablet  01/15/19 22:00  01/17/19 21:20





  Pericolace -  PO   1 tablet





  [facility]   Administration





     





     





     





     











ASSESSMENT/PLAN:


24 Y/O M S/P multilevel laminectomy , is doing better at this time, tolerating 

po and having BMS.








Post laminectomy and bone transplant;: being evaluated and followed by 

neurosurgery and is tolerating rehab well. walks with the walker








Pain control: Uncontrolled, he states that the pain medication is short acting, 

will DC the oxycodone short acting and will give him oxycodone 20 mg SR q12h 

with morphine PRN


Has Bowel regimen


Will also add lidocaine patch to the back


hematuria: trauamatic: resolved and he is urinating with no complaints at this 

time.





DVT ppx:SCD








Diet: regular, tolerating well.














Visit type





- Emergency Visit


Emergency Visit: No





- New Patient


This patient is new to me today: No





- Critical Care


Critical Care patient: No





- Discharge Referral


Referred to [facility] No